# Patient Record
Sex: FEMALE | Race: BLACK OR AFRICAN AMERICAN | NOT HISPANIC OR LATINO | URBAN - NONMETROPOLITAN AREA
[De-identification: names, ages, dates, MRNs, and addresses within clinical notes are randomized per-mention and may not be internally consistent; named-entity substitution may affect disease eponyms.]

---

## 2020-10-28 ENCOUNTER — IMPORTED ENCOUNTER (OUTPATIENT)
Dept: URBAN - NONMETROPOLITAN AREA CLINIC 1 | Facility: CLINIC | Age: 70
End: 2020-10-28

## 2020-10-28 PROBLEM — E11.9: Noted: 2020-10-28

## 2020-10-28 PROBLEM — H25.13: Noted: 2020-10-28

## 2020-10-28 PROBLEM — H52.4: Noted: 2020-10-28

## 2020-10-28 PROBLEM — H35.342: Noted: 2020-10-28

## 2020-10-28 PROCEDURE — 92015 DETERMINE REFRACTIVE STATE: CPT

## 2020-10-28 PROCEDURE — 92014 COMPRE OPH EXAM EST PT 1/>: CPT

## 2020-10-28 NOTE — PATIENT DISCUSSION
NIDDM sans Retinopathy Discussed diagnosis with patient. Discussed the risk of diabetic damage of the retina with potential vision loss and the importance of routine follow-up. Emphasized strict blood sugar control. Continue to monitor. Macular Hole OSDiscussed diagnosis in detail with patient. S/P repair by Dr. Ivette Morrison. Continue to monitor. [place] OUDiscussed diagnosis with patient. Reviewed symptoms related to cataract progression. Discussed various treatment options with patient. Recommend cataract evaluation by Dr. Miroslava Kwong. Patient elects to schedule cat eval.Continue to monitor. Presbyopia OUDiscussed refractive status in detail with patientMR done today; do not recommend updating due to cataract progression. Continue to monitor.

## 2020-12-07 ENCOUNTER — IMPORTED ENCOUNTER (OUTPATIENT)
Dept: URBAN - NONMETROPOLITAN AREA CLINIC 1 | Facility: CLINIC | Age: 70
End: 2020-12-07

## 2020-12-07 PROCEDURE — 92014 COMPRE OPH EXAM EST PT 1/>: CPT

## 2020-12-07 NOTE — PATIENT DISCUSSION
Cataract(s)-Visually significant cataract OU .-Cataract(s) causing symptomatic impairment of visual function not correctable with a tolerable change in glasses or contact lenses lighting or non-operative means resulting in specific activity limitations and/or participation restrictions including but not limited to reading viewing television driving or meeting vocational or recreational needs. -Expectation is clearer vision and functional improvement in symptoms as well as reduced glare disability after cataract removal.-Order IOLMaster and OPD today. -Recommend Stand/Trad based on today's OPD testing and lifestyle questionnaire.-All questions were answered regarding surgery including pre and post-op medications appointments activity restrictions and anesthetic usage.-The risks benefits and alternatives and special risk factors for the patient were discussed in detail including but not limited to: bleeding infection retinal detachment vitreous loss problems with the implant and possible need for additional surgery.-Although rare the possibility of complete vision loss was discussed.-The possible need for glasses post-operatively was discussed.-Order medical clearance exam based on history of IDDM -Patient elects to proceed with cataract surgery OS . Will schedule at patient's convenience and re-evaluate OD  in the future. Discussed all lens options in detail with patient. Patient elects Stand/Trad. Post op inflammation anticipated discussed Dextenza insertion after surgery.

## 2020-12-08 PROBLEM — H25.813: Noted: 2020-12-08

## 2020-12-30 ENCOUNTER — IMPORTED ENCOUNTER (OUTPATIENT)
Dept: URBAN - NONMETROPOLITAN AREA CLINIC 1 | Facility: CLINIC | Age: 70
End: 2020-12-30

## 2020-12-30 PROBLEM — H25.813: Noted: 2020-12-30

## 2021-01-05 ENCOUNTER — IMPORTED ENCOUNTER (OUTPATIENT)
Dept: URBAN - NONMETROPOLITAN AREA CLINIC 1 | Facility: CLINIC | Age: 71
End: 2021-01-05

## 2021-01-05 PROBLEM — I10: Noted: 2021-01-05

## 2021-01-05 PROBLEM — Z01.818: Noted: 2021-01-05

## 2021-01-05 PROBLEM — Z98.42: Noted: 2021-01-26

## 2021-01-05 PROBLEM — E78.5: Noted: 2021-01-05

## 2021-01-05 PROBLEM — H25.811: Noted: 2021-01-26

## 2021-01-05 PROBLEM — E11.9: Noted: 2021-01-05

## 2021-01-21 ENCOUNTER — IMPORTED ENCOUNTER (OUTPATIENT)
Dept: URBAN - NONMETROPOLITAN AREA CLINIC 1 | Facility: CLINIC | Age: 71
End: 2021-01-21

## 2021-01-21 PROCEDURE — 66982 XCAPSL CTRC RMVL CPLX WO ECP: CPT

## 2021-01-21 PROCEDURE — 99024 POSTOP FOLLOW-UP VISIT: CPT

## 2021-01-21 PROCEDURE — 92136 OPHTHALMIC BIOMETRY: CPT

## 2021-01-21 NOTE — PATIENT DISCUSSION
Same day s/p PCIOL OS stand/trad (01/21/21)-Pt doing well s/p PCIOL. -Continue post-op gtts according to instruction sheet and sleep with eye shield over eye for 7 nights.-Avoid bending at the waist lifting anything over 5lbs and dirty or ester environments. Cataract OD- Recommend proceed with cataract surgery as previously scheduled and discussed with Dr. Chandler Guardado.

## 2021-01-26 ENCOUNTER — IMPORTED ENCOUNTER (OUTPATIENT)
Dept: URBAN - NONMETROPOLITAN AREA CLINIC 1 | Facility: CLINIC | Age: 71
End: 2021-01-26

## 2021-01-26 PROBLEM — H25.811: Noted: 2021-01-26

## 2021-01-26 PROBLEM — Z98.42: Noted: 2021-01-26

## 2021-01-26 PROCEDURE — 99024 POSTOP FOLLOW-UP VISIT: CPT

## 2021-01-26 NOTE — PATIENT DISCUSSION
1 week s/p PCIOL OS stand/trad (01/21/21)-Pt doing well at 1 week s/p PCIOL OD.-Continue post-op gtts according to instruction sheet.-Okay to resume usual activities and d/c eye shield. Cataract OS- Recommend proceed with cataract surgery as previously scheduled and discussed with Dr. Stephanie River.

## 2021-02-19 ENCOUNTER — IMPORTED ENCOUNTER (OUTPATIENT)
Dept: URBAN - NONMETROPOLITAN AREA CLINIC 1 | Facility: CLINIC | Age: 71
End: 2021-02-19

## 2021-02-19 PROBLEM — E78.5: Noted: 2021-02-19

## 2021-02-19 PROBLEM — Z01.818: Noted: 2021-02-19

## 2021-02-19 PROBLEM — E11.9: Noted: 2021-02-19

## 2021-02-19 PROBLEM — I10: Noted: 2021-02-19

## 2021-03-05 ENCOUNTER — IMPORTED ENCOUNTER (OUTPATIENT)
Dept: URBAN - NONMETROPOLITAN AREA CLINIC 1 | Facility: CLINIC | Age: 71
End: 2021-03-05

## 2021-03-05 PROBLEM — Z98.41: Noted: 2021-03-05

## 2021-03-05 PROBLEM — Z98.42: Noted: 2021-03-05

## 2021-03-05 PROCEDURE — 66982 XCAPSL CTRC RMVL CPLX WO ECP: CPT

## 2021-03-05 PROCEDURE — 92136 OPHTHALMIC BIOMETRY: CPT

## 2021-03-05 PROCEDURE — 99024 POSTOP FOLLOW-UP VISIT: CPT

## 2021-03-05 NOTE — PATIENT DISCUSSION
Same day s/p PCIOL OD stand/trad (03/05/21)-Pt doing well s/p PCIOL. -Continue post-op gtts according to instruction sheet and sleep with eye shield over eye for 7 nights.-Avoid bending at the waist lifting anything over 5lbs and dirty or ester environments. 1 month s/p PCIOL OS stand/trad (01/21/21)-Pt doing well at 1 week s/p PCIOL OS.-Continue post-op gtts according to instruction sheet.-Okay to resume usual activities and d/c eye shield.

## 2021-03-11 ENCOUNTER — IMPORTED ENCOUNTER (OUTPATIENT)
Dept: URBAN - NONMETROPOLITAN AREA CLINIC 1 | Facility: CLINIC | Age: 71
End: 2021-03-11

## 2021-03-11 PROCEDURE — 99024 POSTOP FOLLOW-UP VISIT: CPT

## 2021-03-11 NOTE — PATIENT DISCUSSION
1 week s/p PCIOL OD stand/trad (03/05/21)-Pt doing well at 1 week s/p PCIOL OD.-Continue post-op gtts according to instruction sheet.-Okay to resume usual activities and d/c eye shield. 1.5 month s/p PCIOL OS stand/trad (01/21/21)-Pt doing well at 1.5 month s/p PCIOL OS.-Okay to resume usual activities.

## 2021-04-06 ENCOUNTER — PREPPED CHART (OUTPATIENT)
Dept: RURAL CLINIC 3 | Facility: CLINIC | Age: 71
End: 2021-04-06

## 2021-04-06 ENCOUNTER — IMPORTED ENCOUNTER (OUTPATIENT)
Dept: URBAN - NONMETROPOLITAN AREA CLINIC 1 | Facility: CLINIC | Age: 71
End: 2021-04-06

## 2021-04-06 PROBLEM — Z98.41: Noted: 2021-03-05

## 2021-04-06 PROBLEM — H52.03: Noted: 2021-04-06

## 2021-04-06 PROBLEM — Z98.42: Noted: 2021-03-05

## 2021-04-06 PROBLEM — H52.4: Noted: 2021-04-06

## 2021-04-06 PROCEDURE — 99024 POSTOP FOLLOW-UP VISIT: CPT

## 2021-04-06 PROCEDURE — 92015 DETERMINE REFRACTIVE STATE: CPT

## 2021-04-06 NOTE — PATIENT DISCUSSION
1 month s/p PCIOL OD stand/trad (03/05/21)-Pt doing well at 1 month s/p PCIOL OD.-Okay to resume usual activities. 2 month s/p PCIOL OS stand/trad (01/21/21)-Pt doing well at 2 month s/p PCIOL OS.-Okay to resume usual activities. Hyperopia-Discussed diagnosis with patient. Presbyopia-Discussed diagnosis with patient. Updated spec Rx given. Recommend lens that will provide comfort as well as protect safety and health of eyes.

## 2022-04-07 ENCOUNTER — ESTABLISHED PATIENT (OUTPATIENT)
Dept: RURAL CLINIC 3 | Facility: CLINIC | Age: 72
End: 2022-04-07

## 2022-04-07 DIAGNOSIS — H52.4: ICD-10-CM

## 2022-04-07 DIAGNOSIS — E11.9: ICD-10-CM

## 2022-04-07 PROCEDURE — 99214 OFFICE O/P EST MOD 30 MIN: CPT

## 2022-04-07 PROCEDURE — 92015 DETERMINE REFRACTIVE STATE: CPT

## 2022-04-07 PROCEDURE — 92250 FUNDUS PHOTOGRAPHY W/I&R: CPT

## 2022-04-07 ASSESSMENT — VISUAL ACUITY
OD_SC: 20/30-
OS_CC: 20/90
OS_PH: 20/80-2
OD_CC: 20/20-2

## 2022-04-07 ASSESSMENT — TONOMETRY
OD_IOP_MMHG: 15
OD_IOP_MMHG: 14
OS_IOP_MMHG: 14
OS_IOP_MMHG: 15

## 2022-04-10 ASSESSMENT — VISUAL ACUITY
OD_CC: 20/25
OS_SC: CF4'
OD_CC: 20/25
OD_CC: 20/25
OD_GLARE: 20/200
OS_CC: 20/60+
OD_PAM: 20/20
OS_CC: 20/200
OD_GLARE: 20/200
OD_CC: 20/80
OD_GLARE: 20/200
OS_CC: 20/100-1
OS_PH: 20/80-
OD_SC: 20/20
OS_CC: 20/80
OS_PH: 20/400
OD_CC: 20/30-
OS_PH: 20/400
OS_CC: 20/40
OD_SC: 20/20
OD_GLARE: 20/200
OD_GLARE: 20/200
OD_CC: 20/30-2
OD_SC: 20/25
OD_PAM: 20/20
OS_CC: 20/80
OD_CC: 20/20
OD_CC: 20/30
OS_SC: CF4'
OS_CC: 20/200
OS_SC: 20/20
OS_CC: 20/100-
OS_SC: CF4'
OS_CC: 20/200
OS_PH: 20/70-1
OD_PAM: 20/20

## 2022-04-10 ASSESSMENT — TONOMETRY
OD_IOP_MMHG: 15
OS_IOP_MMHG: 17
OD_IOP_MMHG: 15
OS_IOP_MMHG: 14
OS_IOP_MMHG: 16
OD_IOP_MMHG: 15
OS_IOP_MMHG: 16
OS_IOP_MMHG: 15
OD_IOP_MMHG: 16
OS_IOP_MMHG: 15
OD_IOP_MMHG: 15
OS_IOP_MMHG: 15
OD_IOP_MMHG: 15
OD_IOP_MMHG: 16

## 2023-07-06 ENCOUNTER — FOLLOW UP (OUTPATIENT)
Dept: RURAL CLINIC 3 | Facility: CLINIC | Age: 73
End: 2023-07-06

## 2023-07-06 DIAGNOSIS — E11.9: ICD-10-CM

## 2023-07-06 PROCEDURE — 92250 FUNDUS PHOTOGRAPHY W/I&R: CPT

## 2023-07-06 PROCEDURE — 99214 OFFICE O/P EST MOD 30 MIN: CPT

## 2023-07-06 ASSESSMENT — TONOMETRY
OS_IOP_MMHG: 14
OD_IOP_MMHG: 14

## 2023-07-06 ASSESSMENT — VISUAL ACUITY
OS_CC: 20/80
OD_CC: 20/20-2
OS_PH: 20/70-2

## 2024-07-09 ENCOUNTER — ESTABLISHED PATIENT (OUTPATIENT)
Dept: RURAL CLINIC 3 | Facility: CLINIC | Age: 74
End: 2024-07-09

## 2024-07-09 DIAGNOSIS — H35.342: ICD-10-CM

## 2024-07-09 DIAGNOSIS — E11.9: ICD-10-CM

## 2024-07-09 PROCEDURE — 99213 OFFICE O/P EST LOW 20 MIN: CPT

## 2024-07-09 PROCEDURE — 92250 FUNDUS PHOTOGRAPHY W/I&R: CPT

## 2024-07-09 ASSESSMENT — VISUAL ACUITY
OD_CC: 20/30-2
OS_PH: 20/80-1
OS_CC: 20/400

## 2024-07-09 ASSESSMENT — TONOMETRY
OD_IOP_MMHG: 14
OS_IOP_MMHG: 14